# Patient Record
Sex: FEMALE | Race: WHITE | URBAN - METROPOLITAN AREA
[De-identification: names, ages, dates, MRNs, and addresses within clinical notes are randomized per-mention and may not be internally consistent; named-entity substitution may affect disease eponyms.]

---

## 2017-01-19 ENCOUNTER — OFFICE VISIT (OUTPATIENT)
Dept: FAMILY MEDICINE | Facility: CLINIC | Age: 42
End: 2017-01-19
Payer: COMMERCIAL

## 2017-01-19 VITALS
DIASTOLIC BLOOD PRESSURE: 78 MMHG | RESPIRATION RATE: 16 BRPM | BODY MASS INDEX: 22.8 KG/M2 | TEMPERATURE: 98.1 F | SYSTOLIC BLOOD PRESSURE: 122 MMHG | HEIGHT: 63 IN | WEIGHT: 128.7 LBS | HEART RATE: 73 BPM | OXYGEN SATURATION: 97 %

## 2017-01-19 DIAGNOSIS — Z00.00 ENCOUNTER FOR ROUTINE ADULT HEALTH EXAMINATION WITHOUT ABNORMAL FINDINGS: Primary | ICD-10-CM

## 2017-01-19 DIAGNOSIS — F17.200 TOBACCO USE DISORDER: ICD-10-CM

## 2017-01-19 DIAGNOSIS — Z12.4 SCREENING FOR MALIGNANT NEOPLASM OF CERVIX: ICD-10-CM

## 2017-01-19 PROCEDURE — 99385 PREV VISIT NEW AGE 18-39: CPT | Performed by: FAMILY MEDICINE

## 2017-01-19 PROCEDURE — 87624 HPV HI-RISK TYP POOLED RSLT: CPT | Performed by: FAMILY MEDICINE

## 2017-01-19 PROCEDURE — G0145 SCR C/V CYTO,THINLAYER,RESCR: HCPCS | Performed by: FAMILY MEDICINE

## 2017-01-19 RX ORDER — LEVONORGESTREL AND ETHINYL ESTRADIOL 0.1-0.02MG
1 KIT ORAL DAILY
Qty: 28 TABLET | Refills: 0 | Status: CANCELLED | OUTPATIENT
Start: 2017-01-19

## 2017-01-19 RX ORDER — LEVONORGESTREL AND ETHINYL ESTRADIOL 0.1-0.02MG
1 KIT ORAL DAILY
Refills: 0 | COMMUNITY
Start: 2016-11-11 | End: 2017-01-19

## 2017-01-19 RX ORDER — BUPROPION HYDROCHLORIDE 150 MG/1
150 TABLET ORAL EVERY MORNING
Qty: 90 TABLET | Refills: 3 | Status: SHIPPED | OUTPATIENT
Start: 2017-01-19

## 2017-01-19 RX ORDER — LEVONORGESTREL AND ETHINYL ESTRADIOL 0.1-0.02MG
1 KIT ORAL DAILY
Qty: 90 TABLET | Refills: 3 | Status: SHIPPED | OUTPATIENT
Start: 2017-01-19

## 2017-01-19 ASSESSMENT — PAIN SCALES - GENERAL: PAINLEVEL: NO PAIN (0)

## 2017-01-19 NOTE — MR AVS SNAPSHOT
After Visit Summary   1/19/2017    Cuca Garcia    MRN: [id number]           Patient Information     Date Of Birth          1975        Visit Information        Provider Department      1/19/2017 3:00 PM Libertad Gonzalez MD [facility]        Today's Diagnoses     Encounter for routine adult health examination without abnormal findings    -  1     Screening for malignant neoplasm of cervix         Tobacco use disorder           Care Instructions      Preventive Health Recommendations  Female Ages 26 - 39  Yearly exam:   See your health care provider every year in order to    Review health changes.     Discuss preventive care.      Review your medicines if you your doctor has prescribed any.    Until age 30: Get a Pap test every three years (more often if you have had an abnormal result).    After age 30: Talk to your doctor about whether you should have a Pap test every 3 years or have a Pap test with HPV screening every 5 years.   You do not need a Pap test if your uterus was removed (hysterectomy) and you have not had cancer.  You should be tested each year for STDs (sexually transmitted diseases), if you're at risk.   Talk to your provider about how often to have your cholesterol checked.  If you are at risk for diabetes, you should have a diabetes test (fasting glucose).  Shots: Get a flu shot each year. Get a tetanus shot every 10 years.   Nutrition:     Eat at least 5 servings of fruits and vegetables each day.    Eat whole-grain bread, whole-wheat pasta and brown rice instead of white grains and rice.    Talk to your provider about Calcium and Vitamin D.     Lifestyle    Exercise at least 150 minutes a week (30 minutes a day, 5 days of the week). This will help you control your weight and prevent disease.    Limit alcohol to one drink per day.    No smoking.     Wear sunscreen to prevent skin cancer.    See your dentist every six months for an exam and  "cleaning.            Follow-ups after your visit        Who to contact     If you have questions or need follow up information about today's clinic visit or your schedule please contact [facility] directly at [phone].  Normal or non-critical lab and imaging results will be communicated to you by MyChart, letter or phone within 4 business days after the clinic has received the results. If you do not hear from us within 7 days, please contact the clinic through MyChart or phone. If you have a critical or abnormal lab result, we will notify you by phone as soon as possible.  Submit refill requests through RelateIQ or call your pharmacy and they will forward the refill request to us. Please allow 3 business days for your refill to be completed.          Additional Information About Your Visit        Inbiomotion[employer]rell[employer] Information     RelateIQ lets you send messages to your doctor, view your test results, renew your prescriptions, schedule appointments and more. To sign up, go to [email or web address] . Click on \"Log in\" on the left side of the screen, which will take you to the Welcome page. Then click on \"Sign up Now\" on the right side of the page.     You will be asked to enter the access code listed below, as well as some personal information. Please follow the directions to create your username and password.     Your access code is: [email or web address]  Expires: 2017  3:37 PM     Your access code will  in 90 days. If you need help or a new code, please call your [facility] clinic or [phone].        Care EveryWhere ID     This is your Care EveryWhere ID. This could be used by other organizations to access your [facility] medical records  [id number]        Your Vitals Were     Pulse Temperature Respirations Height BMI (Body Mass Index) Pulse Oximetry    73 98.1  F (36.7  C) (Oral) 16 5' 2.75\" (1.594 m) 22.98 kg/m2 97%    Last Period                   2016            Blood Pressure from Last 3 " Encounters:   01/19/17 122/78    Weight from Last 3 Encounters:   01/19/17 128 lb 11.2 oz (58.378 kg)              We Performed the Following     HPV High Risk Types DNA Cervical     Pap imaged thin layer screen with HPV - recommended age 30 - 65 years (select HPV order below)          Today's Medication Changes          These changes are accurate as of: 1/19/17  3:37 PM.  If you have any questions, ask your nurse or doctor.               Start taking these medicines.        Dose/Directions    buPROPion 150 MG 24 hr tablet   Commonly known as:  WELLBUTRIN XL   Used for:  Tobacco use disorder   Started by:  Libertad Gonzalez MD        Dose:  150 mg   Take 1 tablet (150 mg) by mouth every morning   Quantity:  90 tablet   Refills:  3            Where to get your medicines      These medications were sent to [facility]/pharmacy #[id number] - [place] - [address]  [address] [facility] [address]     Phone:  [phone]    - buPROPion 150 MG 24 hr tablet  - SRONYX 0.1-20 MG-MCG per tablet             Primary Care Provider Office Phone # Fax #    Libertad Gonzalez [phone]       [facility] [address]   [facility] [address]        Thank you!     Thank you for choosing [facility]  for your care. Our goal is always to provide you with excellent care. Hearing back from our patients is one way we can continue to improve our services. Please take a few minutes to complete the written survey that you may receive in the mail after your visit with us. Thank you!             Your Updated Medication List - Protect others around you: Learn how to safely use, store and throw away your medicines at www.disposemymeds.org.          This list is accurate as of: 1/19/17  3:37 PM.  Always use your most recent med list.                   Brand Name Dispense Instructions for use    buPROPion 150 MG 24 hr tablet    WELLBUTRIN XL    90 tablet    Take 1 tablet (150  mg) by mouth every morning       SRONYX 0.1-20 MG-MCG per tablet   Generic drug:  levonorgestrel-ethinyl estradiol     90 tablet    Take 1 tablet by mouth daily

## 2017-01-19 NOTE — NURSING NOTE
"Chief Complaint   Patient presents with     New Patient     Physical     Physical and Pap     Initial /78 mmHg  Pulse 73  Temp(Src) 98.1  F (36.7  C) (Oral)  Resp 16  Ht 5' 2.75\" (1.594 m)  Wt 128 lb 11.2 oz (58.378 kg)  BMI 22.98 kg/m2  SpO2 97%  LMP 12/27/2016 Estimated body mass index is 22.98 kg/(m^2) as calculated from the following:    Height as of this encounter: 5' 2.75\" (1.594 m).    Weight as of this encounter: 128 lb 11.2 oz (58.378 kg).  BP completed using cuff size regular RIGHT arm.    Lisa Magill, CMA    "

## 2017-01-19 NOTE — PROGRESS NOTES
SUBJECTIVE:     CC: Cuca Garica is an 41 year old woman who presents for preventive health visit.     Healthy Habits:    Do you get at least three servings of calcium containing foods daily (dairy, green leafy vegetables, etc.)? yes    Amount of exercise or daily activities, outside of work: NONE     Problems taking medications regularly No    Medication side effects: No    Have you had an eye exam in the past two years? yes    Do you see a dentist twice per year? yes    Do you have sleep apnea, excessive snoring or daytime drowsiness?no    Skin: The patient reports that she had basal cell carcinoma and had those spots removed in June, and she states that it had been there for two years and it was a pretty big spot. She notes that the reconstructive surgery went really well. She states that her skin lesion looked like a boil and it was red with some pimples on it.     Family: She has two children and one of them was a c section. She notes that she is on birth control pills and she likes them.     Smoking: the patient reports that she is a social smoker and she has tried bupropion before and it helped. She would like to try it again.    Pap: the patient reports that she had her last pap smear last year and it was normal. She notes that she has never had an abnormal pap. She states that she is with a new man since the last year.     Mammo: the patient reports that she has an odd shaped lump in her breast and she had a mammogram 5 years ago and a US, but it hasn't changed at all and it was not found to be dangerous.       Today's PHQ-2 Score:   PHQ-2 ( 1999 Pf[employer]) 1/19/2017   Q1: Little interest or pleasure in doing things 0   Q2: Feeling down, depressed or hopeless 0   PHQ-2 Score 0       Abuse: Current or Past(Physical, Sexual or Emotional)- NO  Do you feel safe in your environment - YES    Social History   Substance Use Topics     Smoking status: Current Some Day Smoker     Smokeless tobacco: Not on file      Alcohol Use: Yes      Comment: socially     The patient does not drink >3 drinks per day nor >7 drinks per week.    No results for input(s): CHOL, HDL, LDL, TRIG, CHOLHDLRATIO, NHDL in the last 95230 hours.    Reviewed orders with patient.  Reviewed health maintenance and updated orders accordingly - Yes    Mammo Decision Support:  Mammogram not appropriate for this patient based on age.    Pertinent mammograms are reviewed under the imaging tab.  History of abnormal Pap smear: Last 3 Pap Results: No results found for: PAP  All Histories reviewed and updated in Epic.      ROS:  C: NEGATIVE for fever, chills, change in weight  I: NEGATIVE for worrisome rashes, moles or lesions  E: NEGATIVE for vision changes or irritation  ENT: NEGATIVE for ear, mouth and throat problems  R: NEGATIVE for significant cough or SOB  B: NEGATIVE for masses, tenderness or discharge  CV: NEGATIVE for chest pain, palpitations or peripheral edema  GI: NEGATIVE for nausea, abdominal pain, heartburn, or change in bowel habits  : NEGATIVE for unusual urinary or vaginal symptoms. Periods are regular.  M: NEGATIVE for significant arthralgias or myalgia  N: NEGATIVE for weakness, dizziness or paresthesias  P: NEGATIVE for changes in mood or affect    Problem list, Medication list, Allergies, and Medical/Social/Surgical histories reviewed in [facility] and updated as appropriate.  Labs reviewed in EPIC  BP Readings from Last 3 Encounters:   01/19/17 122/78    Wt Readings from Last 3 Encounters:   01/19/17 58.378 kg (128 lb 11.2 oz)          This document serves as a record of the services and decisions personally performed and made by Libertad Gonzalez MD. It was created on her behalf by Marisela Hinojosa, a trained medical scribe. The creation of this document is based the provider's statements to the medical scribe.  Marisela Hinojosa January 19, 2017 3:22 PM       OBJECTIVE:     /78 mmHg  Pulse 73  Temp(Src) 98.1  F (36.7  C) (Oral)  Resp 16  Ht 5'  "2.75\" (1.594 m)  Wt 128 lb 11.2 oz (58.378 kg)  BMI 22.98 kg/m2  SpO2 97%  LMP 12/27/2016  EXAM:  GENERAL: healthy, alert and no distress  EYES: Eyes grossly normal to inspection, PERRL and conjunctivae and sclerae normal  HENT: ear canals and TM's normal, nose and mouth without ulcers or lesions  NECK: no adenopathy, no asymmetry, masses, or scars and thyroid normal to palpation  RESP: lungs clear to auscultation - no rales, rhonchi or wheezes  BREAST: normal without masses, tenderness or nipple discharge and no palpable axillary masses or adenopathy  CV: regular rate and rhythm, normal S1 S2, no S3 or S4, no murmur, click or rub, no peripheral edema and peripheral pulses strong  ABDOMEN: soft, nontender, no hepatosplenomegaly, no masses and bowel sounds normal   (female): normal female external genitalia, normal urethral meatus, vaginal mucosa pink, moist, well rugated, and normal cervix/adnexa/uterus without masses or discharge  MS: no gross musculoskeletal defects noted, no edema  SKIN: no suspicious lesions or rashes  NEURO: Normal strength and tone, mentation intact and speech normal  PSYCH: mentation appears normal, affect normal/bright    ASSESSMENT/PLAN:     (Z00.00) Encounter for routine adult health examination without abnormal findings  (primary encounter diagnosis)  Comment: The patient is doing well overall without any current problems other than smoking. She notes that she would like to try bupropion again to help her quit because it helped her in the past. She also noted that she needed birth control refills, and likes her current medication. Potential medication side effects were discussed with the patient; let me know if any occur.   Patient cannot be a smoker and continue on this med, patient says she is not a smoker now, but just socially, i advised this is still to much, she is willing to try the med she used in the past  Plan: SRONYX 0.1-20 MG-MCG per tablet        Follow up in one " "year    (Z12.4) Screening for malignant neoplasm of cervix  Comment: The patient denies a history of abnormal pap smear, has a new partner in the last year  Plan: Pap imaged thin layer screen with HPV -         recommended age 30 - 65 years (select HPV order        below), HPV High Risk Types DNA Cervical        Follow up pending results    (F17.200) Tobacco use disorder, smokes only a few per week, per pt  Comment: The patient reports that she is a current social smoker and would like to try bupropion to help her quit again, as it helped her in the past. Potential medication side effects were discussed with the patient; let me know if any occur.   Plan: buPROPion (WELLBUTRIN XL) 150 MG 24 hr tablet        Follow up if not improving    Patient wants to skip lab work and mammo this year, will check next yr      COUNSELING:   Reviewed preventive health counseling, as reflected in patient instructions       Regular exercise       Healthy diet/nutrition       Contraception       Family planning       Safe sex practices/STD prevention         reports that she has been smoking.  She does not have any smokeless tobacco history on file.  Tobacco Cessation Action Plan: Pharmacotherapies : Zyban/Wellbutrin  Estimated body mass index is 22.98 kg/(m^2) as calculated from the following:    Height as of this encounter: 5' 2.75\" (1.594 m).    Weight as of this encounter: 128 lb 11.2 oz (58.378 kg).       Counseling Resources:  ATP IV Guidelines  Pooled Cohorts Equation Calculator  Breast Cancer Risk Calculator  FRAX Risk Assessment  ICSI Preventive Guidelines  Dietary Guidelines for Americans, 2010  USDA's MyPlate  ASA Prophylaxis  Lung CA Screening    The information in this document, created by the medical scribe for me, accurately reflects the services I personally performed and the decisions made by me. I have reviewed and approved this document for accuracy prior to leaving the patient care area.  Libertad Gonzalez MD 3:22 PM " 1/19/2017           Libertad Gonzalez MD  [facility]

## 2017-01-24 LAB
COPATH REPORT: NORMAL
PAP: NORMAL

## 2017-01-25 LAB
FINAL DIAGNOSIS: NORMAL
HPV HR 12 DNA CVX QL NAA+PROBE: NEGATIVE
HPV16 DNA SPEC QL NAA+PROBE: NEGATIVE
HPV18 DNA SPEC QL NAA+PROBE: NEGATIVE
SPECIMEN DESCRIPTION: NORMAL

## 2017-02-06 ENCOUNTER — OFFICE VISIT (OUTPATIENT)
Dept: FAMILY MEDICINE | Facility: CLINIC | Age: 42
End: 2017-02-06
Payer: COMMERCIAL

## 2017-02-06 VITALS
DIASTOLIC BLOOD PRESSURE: 86 MMHG | SYSTOLIC BLOOD PRESSURE: 126 MMHG | WEIGHT: 129.5 LBS | OXYGEN SATURATION: 98 % | TEMPERATURE: 98.5 F | RESPIRATION RATE: 16 BRPM | HEART RATE: 83 BPM | BODY MASS INDEX: 23.12 KG/M2

## 2017-02-06 DIAGNOSIS — B35.1 ONYCHOMYCOSIS: Primary | ICD-10-CM

## 2017-02-06 DIAGNOSIS — F17.200 TOBACCO USE DISORDER: ICD-10-CM

## 2017-02-06 LAB
ALBUMIN SERPL-MCNC: 3.5 G/DL (ref 3.4–5)
ALP SERPL-CCNC: 53 U/L (ref 40–150)
ALT SERPL W P-5'-P-CCNC: 16 U/L (ref 0–50)
AST SERPL W P-5'-P-CCNC: 16 U/L (ref 0–45)
BILIRUB DIRECT SERPL-MCNC: 0.1 MG/DL (ref 0–0.2)
BILIRUB SERPL-MCNC: 0.6 MG/DL (ref 0.2–1.3)
PROT SERPL-MCNC: 7 G/DL (ref 6.8–8.8)

## 2017-02-06 PROCEDURE — 99213 OFFICE O/P EST LOW 20 MIN: CPT | Performed by: FAMILY MEDICINE

## 2017-02-06 PROCEDURE — 36415 COLL VENOUS BLD VENIPUNCTURE: CPT | Performed by: FAMILY MEDICINE

## 2017-02-06 PROCEDURE — 80076 HEPATIC FUNCTION PANEL: CPT | Performed by: FAMILY MEDICINE

## 2017-02-06 RX ORDER — TERBINAFINE HYDROCHLORIDE 250 MG/1
250 TABLET ORAL DAILY
Qty: 90 TABLET | Refills: 0 | Status: SHIPPED | OUTPATIENT
Start: 2017-02-06

## 2017-02-06 ASSESSMENT — PAIN SCALES - GENERAL: PAINLEVEL: NO PAIN (1)

## 2017-02-06 NOTE — MR AVS SNAPSHOT
After Visit Summary   2/6/2017    Cuca Garcia    MRN: [id number]           Patient Information     Date Of Birth          1975        Visit Information        Provider Department      2/6/2017 7:20 AM Libertad Gonzalez MD [facility]        Today's Diagnoses     Onychomycosis    -  1     Tobacco use disorder            Follow-ups after your visit        Who to contact     If you have questions or need follow up information about today's clinic visit or your schedule please contact [facility] directly at [phone].  Normal or non-critical lab and imaging results will be communicated to you by [employer], letter or phone within 4 business days after the clinic has received the results. If you do not hear from us within 7 days, please contact the clinic through An[employer] or phone. If you have a critical or abnormal lab result, we will notify you by phone as soon as possible.  Submit refill requests through SimpleRegistry or call your pharmacy and they will forward the refill request to us. Please allow 3 business days for your refill to be completed.          Additional Information About Your Visit        MyChart Information     SimpleRegistry gives you secure access to your electronic health record. If you see a primary care provider, you can also send messages to your care team and make appointments. If you have questions, please call your primary care clinic.  If you do not have a primary care provider, please call [phone] and they will assist you.        Care EveryWhere ID     This is your Care EveryWhere ID. This could be used by other organizations to access your [facility] medical records  [id number]        Your Vitals Were     Pulse Temperature Respirations Pulse Oximetry Last Period       83 98.5  F (36.9  C) (Oral) 16 98% 12/27/2016        Blood Pressure from Last 3 Encounters:   02/06/17 126/86   01/19/17 122/78    Weight from Last 3 Encounters:   02/06/17 129  lb 8 oz (58.741 kg)   01/19/17 128 lb 11.2 oz (58.378 kg)              We Performed the Following     Hepatic panel          Today's Medication Changes          These changes are accurate as of: 2/6/17  2:43 PM.  If you have any questions, ask your nurse or doctor.               Start taking these medicines.        Dose/Directions    terbinafine 250 MG tablet   Commonly known as:  lamISIL   Used for:  Onychomycosis   Started by:  Libertad oGnzalez MD        Dose:  250 mg   Take 1 tablet (250 mg) by mouth daily   Quantity:  90 tablet   Refills:  0            Where to get your medicines      These medications were sent to [facility]/pharmacy #[id number] - [place], [place] - [address]  [address]  [address], [facility] [address]     Phone:  [phone]    - terbinafine 250 MG tablet             Primary Care Provider Office Phone # Fax #    Libertad Gonzalez [phone]       [facility] [address]   [facility] [address]        Thank you!     Thank you for choosing [facility]  for your care. Our goal is always to provide you with excellent care. Hearing back from our patients is one way we can continue to improve our services. Please take a few minutes to complete the written survey that you may receive in the mail after your visit with us. Thank you!             Your Updated Medication List - Protect others around you: Learn how to safely use, store and throw away your medicines at www.disposemymeds.org.          This list is accurate as of: 2/6/17  2:43 PM.  Always use your most recent med list.                   Brand Name Dispense Instructions for use    buPROPion 150 MG 24 hr tablet    WELLBUTRIN XL    90 tablet    Take 1 tablet (150 mg) by mouth every morning       SRONYX 0.1-20 MG-MCG per tablet   Generic drug:  levonorgestrel-ethinyl estradiol     90 tablet    Take 1 tablet by mouth daily       terbinafine 250 MG tablet    lamISIL    90 tablet    Take 1  tablet (250 mg) by mouth daily

## 2017-02-06 NOTE — NURSING NOTE
"Chief Complaint   Patient presents with     Toenail     Initial /86 mmHg  Pulse 83  Temp(Src) 98.5  F (36.9  C) (Oral)  Resp 16  Wt 129 lb 8 oz (58.741 kg)  SpO2 98%  LMP 12/27/2016 Estimated body mass index is 23.12 kg/(m^2) as calculated from the following:    Height as of 1/19/17: 5' 2.75\" (1.594 m).    Weight as of this encounter: 129 lb 8 oz (58.741 kg).  BP completed using cuff size regular right arm.    Lisa Magill, CMA    "

## 2017-02-06 NOTE — PROGRESS NOTES
"HPI   SUBJECTIVE:                                                    Cuca Garcia is a 41 year old female who presents to clinic today for the following health issues:    Concern - both big toes possible nail infection, five years ago had a nail infection      Onset: 01/22/17     Description:   Both big toes(right worse than the left)    Intensity: mild, 1/10    Progression of Symptoms:  same    Accompanying Signs & Symptoms:  Nothing        Previous history of similar problem:   YES    Precipitating factors:   Worsened by: when patient bumps her toes     Alleviating factors:  Improved by: has NOT tried anything        Therapies Tried and outcome: was on a RX in the PAST     Noticed this issue a few weeks ago following physical. Reports she has had a nail infection in the past and was on Lamisil. Reports she noticed this after showering and had a sensation of \"fluid under her toenail.\"  Lamisil worked in the past for this issue.     PROBLEMS TO ADD ON...  wellbutrin follow up: notes that she started and is going well. Reports that have recently been on vacation, and is planning on quitting smoking now that she is back. Reports that her significant other does smoke socially as well and that he is ready to quit as well.      Problem list and histories reviewed & adjusted, as indicated.  Additional history: as documented    BP Readings from Last 3 Encounters:   02/06/17 126/86   01/19/17 122/78    Wt Readings from Last 3 Encounters:   02/06/17 58.741 kg (129 lb 8 oz)   01/19/17 58.378 kg (128 lb 11.2 oz)             Labs reviewed in EPIC  Problem list, Medication list, Allergies, and Medical/Social/Surgical histories reviewed in [facility] and updated as appropriate.    ROS:  Constitutional, HEENT, cardiovascular, pulmonary, gi and gu systems are negative, except as otherwise noted.    This document serves as a record of the services and decisions personally performed and made by Libertad Gonzalez MD. It was created on her " behalf by Marnie Yap, a trained medical scribe. The creation of this document is based the provider's statements to the medical scribe.  Marnie Fracisco February 6, 2017 7:36 AM    OBJECTIVE:                                                    /86 mmHg  Pulse 83  Temp(Src) 98.5  F (36.9  C) (Oral)  Resp 16  Wt 58.741 kg (129 lb 8 oz)  SpO2 98%  LMP 12/27/2016  Body mass index is 23.12 kg/(m^2).  GENERAL: healthy, alert and no distress  MS: no gross musculoskeletal defects noted, no edema. Great toenail abnormalities bilaterally, raised and bumpy, thickened and raised slightly off the bed.  SKIN: no suspicious lesions or rashes  NEURO: Normal strength and tone, mentation intact and speech normal  PSYCH: mentation appears normal, affect normal/bright    Diagnostic Test Results:  none      ASSESSMENT/PLAN:                                                    (B35.1) Onychomycosis  (primary encounter diagnosis)  Comment: Will test liver function today due to starting lamisil. Will start lamisil as it has been effective in the past. If not improving discussed following up as we can get a toenail clipping.   Plan: terbinafine (LAMISIL) 250 MG tablet, Hepatic         panel        Follow up as needed.     (F17.200) Tobacco use disorder  Comment: Patient recently back from vacation. Beginning to work on quitting smoking. Has significant other hoping to quit with her. No refills needed.   Plan: Follow up as needed.     The information in this document, created by the medical scribe for me, accurately reflects the services I personally performed and the decisions made by me. I have reviewed and approved this document for accuracy prior to leaving the patient care area.  Libertad Gonzalez MD 7:36 AM 2/6/2017          Libertad Gonzalez MD  [facility]      Physical Exam

## 2020-03-10 ENCOUNTER — HEALTH MAINTENANCE LETTER (OUTPATIENT)
Age: 45
End: 2020-03-10

## 2020-12-27 ENCOUNTER — HEALTH MAINTENANCE LETTER (OUTPATIENT)
Age: 45
End: 2020-12-27

## 2021-04-24 ENCOUNTER — HEALTH MAINTENANCE LETTER (OUTPATIENT)
Age: 46
End: 2021-04-24

## 2021-10-09 ENCOUNTER — HEALTH MAINTENANCE LETTER (OUTPATIENT)
Age: 46
End: 2021-10-09

## 2022-05-16 ENCOUNTER — HEALTH MAINTENANCE LETTER (OUTPATIENT)
Age: 47
End: 2022-05-16

## 2022-09-11 ENCOUNTER — HEALTH MAINTENANCE LETTER (OUTPATIENT)
Age: 47
End: 2022-09-11

## 2023-01-23 ENCOUNTER — HEALTH MAINTENANCE LETTER (OUTPATIENT)
Age: 48
End: 2023-01-23

## 2023-06-03 ENCOUNTER — HEALTH MAINTENANCE LETTER (OUTPATIENT)
Age: 48
End: 2023-06-03